# Patient Record
Sex: FEMALE | Race: OTHER
[De-identification: names, ages, dates, MRNs, and addresses within clinical notes are randomized per-mention and may not be internally consistent; named-entity substitution may affect disease eponyms.]

---

## 2018-04-30 ENCOUNTER — HOSPITAL ENCOUNTER (EMERGENCY)
Dept: HOSPITAL 74 - FER | Age: 33
Discharge: HOME | End: 2018-04-30
Payer: COMMERCIAL

## 2018-04-30 VITALS — DIASTOLIC BLOOD PRESSURE: 93 MMHG | HEART RATE: 72 BPM | TEMPERATURE: 98 F | SYSTOLIC BLOOD PRESSURE: 142 MMHG

## 2018-04-30 VITALS — BODY MASS INDEX: 27.8 KG/M2

## 2018-04-30 DIAGNOSIS — H60.553: Primary | ICD-10-CM

## 2018-04-30 DIAGNOSIS — H61.23: ICD-10-CM

## 2018-04-30 NOTE — PDOC
History of Present Illness





- General


History Source: Patient


Exam Limitations: No Limitations





- History of Present Illness


Initial Comments: 





04/30/18 18:38


History of Present illness:The patient is 33 year old female presents to the 

emergency department with left ear pain since 3 days ago. The patient reports 

she was cleaning her ears last Thursday with a Qtip when her hearing became 

muffled, the patient then use hydrogen peroxide to flush to clean her left ear. 

The patient reports when she woke up on Friday morning she had a sharp 

intermittent pain in the L. ear, which radiates to her occiput and rates 9/10 

in severity. The patient reports the pain is aggravated when swallowing or 

burping without any relieving factors. The patient reports a new onset of 

lightheadedness during exercise since the incident. Denies fever, chills, 

cough. Denies nausea, vomiting, constipation or diarrhea. Denies numbness, 

tingling, or vertigo. Denies any trauma to the head or loss of any sensations. 





Past Medical History: None reported 





Social History: Patient reports occasional alcohol use. Denies history of 

smoking or recreational drug use. 





Family History: None reported 





PCP: None reported. 





Last menstruation cycle: April 8th 2018





Allergies: None reported. 











<Crystal Elizondo - Last Filed: 04/30/18 18:38>





<Callum Armenta - Last Filed: 04/30/18 18:41>





- General


Chief Complaint: Pain


Stated Complaint: LEFT EAR PAIN


Time Seen by Provider: 04/30/18 17:41





Past History





<Crystal Elizondo - Last Filed: 04/30/18 18:38>





- Past Medical History


COPD: No


Other medical history: DENIES





- Suicide/Smoking/Psychosocial Hx


Smoking History: Never smoked


Have you smoked in the past 12 months: No


Information on smoking cessation initiated: No


Hx Alcohol Use: Yes (SOCIAL)


Drug/Substance Use Hx: No


Substance Use Type: Alcohol





<Callum Armenta - Last Filed: 04/30/18 18:41>





- Past Medical History


Allergies/Adverse Reactions: 


 Allergies











Allergy/AdvReac Type Severity Reaction Status Date / Time


 


No Known Allergies Allergy   Verified 04/30/18 17:36











Home Medications: 


Ambulatory Orders





Neomycin/Polymyxn/Hc [Cortisporin Otic Suspenstion -] 4 drop AU Q4HWA #1 bottle 

04/30/18 











**Review of Systems





- Review of Systems


Able to Perform ROS?: Yes


Comments:: 





04/30/18 18:38


 CONSTITUTIONAL:


Absent: fever, no chills, no fatigue


EYES:


Absent: visual changes


ENT: (+) Left ear pain 


Absent: no sore throat


CARDIOVASCULAR:


Absent: chest pain, no palpitations


RESPIRATORY:


Absent: cough, no SOB


GI:


Absent: abdominal pain, no nausea, no vomiting, no constipation, no diarrhea


GENITOURINARY:


Absent: dysuria, no frequency, no hematuria


MUSKULOSKELETAL:


Absent: back pain, no arthralgia, no myalgia


SKIN:


Absent: rash


NEURO:


Absent: headache











<Crystal Elizondo - Last Filed: 04/30/18 18:38>





*Physical Exam





- Vital Signs


 Last Vital Signs











Temp Pulse Resp BP Pulse Ox


 


 98 F   72   16   142/93   100 


 


 04/30/18 17:35  04/30/18 17:35  04/30/18 17:35  04/30/18 17:35  04/30/18 17:35














- Physical Exam


Comments: 





04/30/18 18:39


GENERAL: 


Well-appearing, well-nourished. No apparent distress.


HEENT: (+) Bilateral cerumen impaction. (+) no cervical adenopathy. 


Nose and Throat: clear 


Normocephalic, atraumatic. PERRL, EOM intact.


CARDIOVASCULAR: 


Normal S1, S2. Regular rate and rhythm.


PULMONARY: 


Clear to auscultation bilaterally.


ABDOMEN: 


Soft, non-distended, non-tender. 


EXTREMITIES: 


Normal ROM in all four extremities. No gross deformities.


SKIN: 


Warm, dry.  No rash


NEUROLOGICAL: 


No focal neurological deficits.








<Crytsal Elizondo - Last Filed: 04/30/18 18:38>





- Vital Signs


 Last Vital Signs











Temp Pulse Resp BP Pulse Ox


 


 98 F   72   16   142/93   100 


 


 04/30/18 17:35  04/30/18 17:35  04/30/18 17:35  04/30/18 17:35  04/30/18 17:35














<Callum Armenta - Last Filed: 04/30/18 18:41>





Medical Decision Making





- Medical Decision Making





04/30/18 18:06


Patient with ear pain and bilateral cerumen impactions. Decreased hearing. No 

sore throat, other URI symptoms, cough, or seasonal ALLERGIES





Gentle ear irrigation was performed with cerumen expelled bilaterally. Both TMs 

appear opaque. There is irritation of the canals bilaterally, possibly from the 

use of Q-tips.





Impression bilateral cerumen impactions, otitis externa





Plan: Antibiotic eardrops and ENT follow-up. Patient in no significant pain or 

other distress upon discharge to follow-up as directed





<Callum Armenta - Last Filed: 04/30/18 18:41>





*DC/Admit/Observation/Transfer





- Attestations


Scribe Attestion: 





04/30/18 18:39





Documentation prepared by Crystal Elizondo, acting as medical scribe for Callum Armenta MD.





<Crystal Elizondo - Last Filed: 04/30/18 18:38>





- Discharge Dispostion


Admit: No





<Callum Armenta - Last Filed: 04/30/18 18:41>


Diagnosis at time of Disposition: 


Otitis externa


Qualifiers:


 Otitis externa type: noninfectious Noninfectious otitis externa type: reactive 

Chronicity: acute Laterality: bilateral Qualified Code(s): H60.553 - Acute 

reactive otitis externa, bilateral





Cerumen impaction


Qualifiers:


 Laterality: bilateral Qualified Code(s): H61.23 - Impacted cerumen, bilateral








- Discharge Dispostion


Disposition: HOME


Condition at time of disposition: Improved





- Prescriptions


Prescriptions: 


Neomycin/Polymyxn/Hc [Cortisporin Otic Suspenstion -] 4 drop AU Q4HWA #1 bottle





- Referrals


Referrals: 


Cecilio Garvin MD [Staff Physician] - 1 week





- Patient Instructions


Printed Discharge Instructions:  DI for Cerumen Impaction, DI for Otitis Externa


Additional Instructions: 


See ENT specialist for further evaluation and treatment in 3-5 days.